# Patient Record
Sex: MALE | Race: WHITE | NOT HISPANIC OR LATINO | Employment: FULL TIME | URBAN - METROPOLITAN AREA
[De-identification: names, ages, dates, MRNs, and addresses within clinical notes are randomized per-mention and may not be internally consistent; named-entity substitution may affect disease eponyms.]

---

## 2017-09-11 ENCOUNTER — GENERIC CONVERSION - ENCOUNTER (OUTPATIENT)
Dept: OTHER | Facility: OTHER | Age: 27
End: 2017-09-11

## 2021-03-12 ENCOUNTER — HOSPITAL ENCOUNTER (EMERGENCY)
Facility: HOSPITAL | Age: 31
Discharge: HOME | End: 2021-03-13
Attending: EMERGENCY MEDICINE
Payer: COMMERCIAL

## 2021-03-12 VITALS
SYSTOLIC BLOOD PRESSURE: 157 MMHG | OXYGEN SATURATION: 99 % | TEMPERATURE: 98.2 F | HEART RATE: 83 BPM | RESPIRATION RATE: 18 BRPM | DIASTOLIC BLOOD PRESSURE: 92 MMHG

## 2021-03-12 DIAGNOSIS — K08.89 DENTALGIA: Primary | ICD-10-CM

## 2021-03-12 DIAGNOSIS — K02.9 DENTAL CARIES: ICD-10-CM

## 2021-03-12 PROCEDURE — 99282 EMERGENCY DEPT VISIT SF MDM: CPT

## 2021-03-12 RX ORDER — CHLORHEXIDINE GLUCONATE 0.12 MG/ML
15 RINSE ORAL ONCE
Status: COMPLETED | OUTPATIENT
Start: 2021-03-12 | End: 2021-03-13

## 2021-03-12 RX ORDER — ACETAMINOPHEN 325 MG/1
650 TABLET ORAL ONCE
Status: COMPLETED | OUTPATIENT
Start: 2021-03-12 | End: 2021-03-13

## 2021-03-12 RX ORDER — OXYCODONE HYDROCHLORIDE 5 MG/1
5 TABLET ORAL EVERY 6 HOURS PRN
Qty: 5 TABLET | Refills: 0 | Status: SHIPPED | OUTPATIENT
Start: 2021-03-12

## 2021-03-12 RX ORDER — BUPIVACAINE HYDROCHLORIDE 5 MG/ML
5 INJECTION, SOLUTION EPIDURAL; INTRACAUDAL ONCE
Status: COMPLETED | OUTPATIENT
Start: 2021-03-12 | End: 2021-03-13

## 2021-03-12 RX ORDER — LIDOCAINE HYDROCHLORIDE 10 MG/ML
6 INJECTION, SOLUTION EPIDURAL; INFILTRATION; INTRACAUDAL; PERINEURAL ONCE
Status: COMPLETED | OUTPATIENT
Start: 2021-03-12 | End: 2021-03-13

## 2021-03-13 PROCEDURE — 99284 EMERGENCY DEPT VISIT MOD MDM: CPT | Performed by: PHYSICIAN ASSISTANT

## 2021-03-13 RX ADMIN — CHLORHEXIDINE GLUCONATE 15 ML: 1.2 RINSE ORAL at 00:50

## 2021-03-13 RX ADMIN — BENZOCAINE 2 APPLICATION: 220 GEL, DENTIFRICE DENTAL at 00:50

## 2021-03-13 RX ADMIN — ACETAMINOPHEN 650 MG: 325 TABLET, FILM COATED ORAL at 00:06

## 2021-03-13 RX ADMIN — BUPIVACAINE HYDROCHLORIDE 5 ML: 5 INJECTION, SOLUTION EPIDURAL; INTRACAUDAL at 00:50

## 2021-03-13 RX ADMIN — LIDOCAINE HYDROCHLORIDE 6 ML: 10 INJECTION, SOLUTION EPIDURAL; INFILTRATION; INTRACAUDAL; PERINEURAL at 00:49

## 2021-03-13 NOTE — ED PROVIDER NOTES
EMERGENCY MEDICINE NOTE        PATIENT IDENTIFICATION PHYSICIAN/SERVICE INFORMATION   Name: Reuben Rosado  MRN: [id number]  YOB: 1990  Age/Sex: 27 y o  male  Preferred Language: English  Code Status: No Order  Encounter Date: 3/12/2021  Attending Physician: Lauren Hayes MD  Admitting Physician: No admitting provider for patient encounter  Primary Care Physician: No primary care provider on file  Primary Care Phone: None       CHIEF COMPLAINT     Chief Complaint   Patient presents with    Dental Pain     pt had root canal done, told he had abscess in there as well and given PO antibiotics  on abx since tuesday  pt states he feels like its getting better but his tooth is extremely painful  HISTORY OF PRESENT ILLNESS       History provided by:  Patient   used: No    Dental Pain  Location:  Upper  Upper teeth location:  2/RU 2nd molar  Quality:  Throbbing  Severity:  Moderate  Onset quality:  Gradual  Timing:  Constant  Progression:  Waxing and waning  Chronicity:  New  Context: dental caries, enamel fracture and poor dentition    Context: not abscess, cap still on, not crown fracture, filling intact, not intrusion, not malocclusion, not recent dental surgery and not trauma    Relieved by:  Nothing  Worsened by:  Touching  Ineffective treatments:  Acetaminophen and NSAIDs  Associated symptoms: facial pain    Associated symptoms: no congestion, no difficulty swallowing, no drooling, no facial swelling, no fever, no gum swelling, no headaches, no neck pain, no neck swelling, no oral bleeding, no oral lesions and no trismus    Risk factors: lack of dental care (just got insurance, saw dentist and was given amoxicillin which he is still taking) and smoking    Risk factors: no alcohol problem, no cancer, no chewing tobacco use, no diabetes, no immunosuppression and no periodontal disease          PAST MEDICAL AND SURGICAL HISTORY     History reviewed   No pertinent past medical history  Past Surgical History:   Procedure Laterality Date    WISDOM TOOTH EXTRACTION         History reviewed  No pertinent family history  E-Cigarette/Vaping     E-Cigarette/Vaping Substances     Social History     Tobacco Use    Smoking status: Current Some Day Smoker    Smokeless tobacco: Never Used   Substance Use Topics    Alcohol use: Yes     Comment: social    Drug use: Not Currently         ALLERGIES     No Known Allergies      HOME MEDICATIONS     None         REVIEW OF SYSTEMS     Review of Systems   Constitutional: Negative for activity change, appetite change, chills, diaphoresis, fatigue and fever  HENT: Positive for dental problem  Negative for congestion, drooling, facial swelling and mouth sores  Respiratory: Negative for cough and shortness of breath  Cardiovascular: Negative for chest pain  Gastrointestinal: Negative for abdominal pain  Musculoskeletal: Negative for neck pain  Skin: Negative for rash  Neurological: Negative for headaches  All other systems reviewed and are negative  PHYSICAL EXAMINATION     ED Triage Vitals   Temperature Pulse Respirations Blood Pressure SpO2   03/12/21 2230 03/12/21 2232 03/12/21 2232 03/12/21 2232 03/12/21 2232   98 2 °F (36 8 °C) 83 18 157/92 99 %      Temp Source Heart Rate Source Patient Position - Orthostatic VS BP Location FiO2 (%)   03/12/21 2230 03/12/21 2232 03/12/21 2232 03/12/21 2232 --   Oral Monitor Sitting Left arm       Pain Score       --                Wt Readings from Last 3 Encounters:   No data found for Wt         Physical Exam  Vitals signs and nursing note reviewed  Constitutional:       General: He is not in acute distress  Appearance: He is well-developed  He is not diaphoretic  HENT:      Head: Normocephalic and atraumatic  Mouth/Throat:      Mouth: Mucous membranes are moist       Dentition: Dental tenderness and dental caries present  Comments: No trismus   No facial asymmetry  Eyes:      Conjunctiva/sclera: Conjunctivae normal       Pupils: Pupils are equal, round, and reactive to light  Neck:      Musculoskeletal: Normal range of motion and neck supple  Cardiovascular:      Rate and Rhythm: Normal rate and regular rhythm  Pulmonary:      Effort: Pulmonary effort is normal  No respiratory distress  Musculoskeletal: Normal range of motion  Skin:     General: Skin is warm  Capillary Refill: Capillary refill takes less than 2 seconds  Findings: No rash  Neurological:      Mental Status: He is alert and oriented to person, place, and time  DIAGNOSTIC RESULTS     Laboratory results:    Labs Reviewed - No data to display    All labs reviewed and utilized in the medical decision making process    Radiology results:    No orders to display       All radiology studies independently viewed by me and interpreted by the radiologist       PROCEDURES     Procedures      Invasive Devices     None                 ASSESSMENT AND PLAN     MDM  Number of Diagnoses or Management Options  Dental caries: new, no workup  Dentalgia: new, no workup     Amount and/or Complexity of Data Reviewed  Review and summarize past medical records: yes    Risk of Complications, Morbidity, and/or Mortality  Presenting problems: moderate  Diagnostic procedures: low  Management options: moderate    Patient Progress  Patient progress: improved      Initial ED assessment:  Madelin De La Vega  is a 27 y o  male with no significant PMH who presents with Dental Pain  Vitals signs reviewed and WNL  Physical examination is remarkable for dental caries    Initial Ddx  includes but is not limited to:   dental javier, dental infection, dental abscess, dry socket, gingivitis; doubt govind's angina  Initial ED plan:   Plan will be to treat symptomatically   Final ED summary/disposition: Home care recommendations given with discharge paperwork   Return to ED instructions given if new/worsening sxs  Verbalized understanding  MDM  Reviewed: previous chart, nursing note and vitals          ED COURSE OF CARE AND REASSESSMENT     ED Course as of Mar 16 0357   Fri Mar 12, 2021   0827 Electronic controlled substance prescription not electronically prescribed due to being impractical for the patient to obtain the controlled substance by electronic prescription or would cause an untimely delay resulting in an adverse impact on the patient's medical condition  Medications   lidocaine (PF) (XYLOCAINE-MPF) 1 % injection 6 mL (6 mL Infiltration Given 3/13/21 0049)   bupivacaine (PF) (MARCAINE) 0 5 % injection 5 mL (5 mL Injection Given 3/13/21 0050)   BENZOCAINE (DENTAL) 20 % swab 2 application (2 application Oral Given 8/34/56 0050)   chlorhexidine (PERIDEX) 0 12 % oral rinse 15 mL (15 mL Swish & Spit Given 3/13/21 0050)   acetaminophen (TYLENOL) tablet 650 mg (650 mg Oral Given 3/13/21 0006)         FINAL IMPRESSION     Final diagnoses:   Dentalgia   Dental caries         DISPOSITION AND PLANNING     Time reflects when diagnosis was documented in both MDM as applicable and the Disposition within this note     Time User Action Codes Description Comment    3/12/2021 10:55 PM Fredda Share Add [K08 89] [address] [place] [address]     3/12/2021 10:55 PM Fredda Share Add [K02 9] Dental caries       ED Disposition     ED Disposition Condition Date/Time Comment    Discharge Stable Fri Mar 12, 2021 10:55 PM Nestora Lager  discharge to home/self care              Follow-up Information     Follow up With Specialties Details Why Contact Info Additional Information    [facility]  Call  For follow up [address]  [phone]     [employer]  Call  Call [employer] at  [phone] Gianni Aden ([phone]) to obtain a primary care physician for established care, reeval   They will be able to schedule you with a physician who sees patients with your insurance and physicians who see patients without insurance [id number] Internal Medicine [facility] [place] [facility] Call  For follow up [address] [address]  [address] [facility] Internal Medicine [facility] [place], [address] [place] [address] NE[facility] Emergency Department Emergency Medicine Go to  If symptoms worsen [address] [facility] [address] [facility] Emergency Department, [address], [facility] [place], [place], Amber Infantereina [facility] [address]    Nicolas Rajan [address]  [phone]  Call   For follow up    Eduardo Peterson  [phone]    Call   Call Cece Boo at  [phone] Angela Pitts ([phone]) to obtain a primary care physician for established care, reeval   They will be able to schedule you with a physician who sees patients with your insurance and physicians who see patients without insurance    [facility] Internal Medicine [facility] [place]  RACHAEL Madridandrei [address]  [phone]  Call   For follow up    [address] [facility] [address] [address]  [phone]  Go to   If symptoms worsen        DISCHARGE MEDICATIONS     Discharge Medication List as of 3/12/2021 10:56 PM      START taking these medications    Details   benzocaine (HURRICAINE) 20 % 1 application by Mucosal route 4 (four) times a day as needed for mucositis, Starting Fri 3/12/2021, Print             No discharge procedures on file      PDMP Review       Value Time User    PDMP Reviewed  Yes 3/12/2021 10:57 PM AMARI Reyes PA-C Shireen Nao, [place]  03/16/21 [id number]

## 2021-11-22 ENCOUNTER — HOSPITAL ENCOUNTER (EMERGENCY)
Facility: HOSPITAL | Age: 31
Discharge: HOME | End: 2021-11-22
Attending: EMERGENCY MEDICINE | Admitting: EMERGENCY MEDICINE
Payer: COMMERCIAL

## 2021-11-22 VITALS
HEART RATE: 96 BPM | WEIGHT: 315 LBS | DIASTOLIC BLOOD PRESSURE: 80 MMHG | OXYGEN SATURATION: 98 % | HEIGHT: 72 IN | TEMPERATURE: 98.7 F | RESPIRATION RATE: 20 BRPM | BODY MASS INDEX: 42.66 KG/M2 | SYSTOLIC BLOOD PRESSURE: 136 MMHG

## 2021-11-22 DIAGNOSIS — K02.9 PAIN DUE TO DENTAL CARIES: Primary | ICD-10-CM

## 2021-11-22 PROCEDURE — 96372 THER/PROPH/DIAG INJ SC/IM: CPT

## 2021-11-22 PROCEDURE — 99284 EMERGENCY DEPT VISIT MOD MDM: CPT

## 2021-11-22 PROCEDURE — 99282 EMERGENCY DEPT VISIT SF MDM: CPT

## 2021-11-22 RX ORDER — BUPIVACAINE HYDROCHLORIDE 5 MG/ML
5 INJECTION, SOLUTION EPIDURAL; INTRACAUDAL ONCE
Status: COMPLETED | OUTPATIENT
Start: 2021-11-22 | End: 2021-11-22

## 2021-11-22 RX ORDER — PENICILLIN V POTASSIUM 500 MG/1
500 TABLET ORAL 3 TIMES DAILY
Qty: 30 TABLET | Refills: 0 | Status: SHIPPED | OUTPATIENT
Start: 2021-11-22 | End: 2021-12-02

## 2021-11-22 RX ORDER — KETOROLAC TROMETHAMINE 30 MG/ML
15 INJECTION, SOLUTION INTRAMUSCULAR; INTRAVENOUS ONCE
Status: COMPLETED | OUTPATIENT
Start: 2021-11-22 | End: 2021-11-22

## 2021-11-22 RX ADMIN — BUPIVACAINE HYDROCHLORIDE 5 ML: 5 INJECTION, SOLUTION EPIDURAL; INTRACAUDAL at 05:32

## 2021-11-22 RX ADMIN — BENZOCAINE 1 APPLICATION: 220 GEL, DENTIFRICE DENTAL at 05:34

## 2021-11-22 RX ADMIN — KETOROLAC TROMETHAMINE 15 MG: 30 INJECTION, SOLUTION INTRAMUSCULAR at 05:31

## 2022-08-18 ENCOUNTER — HOSPITAL ENCOUNTER (EMERGENCY)
Facility: HOSPITAL | Age: 32
Discharge: HOME | End: 2022-08-18
Attending: EMERGENCY MEDICINE
Payer: COMMERCIAL

## 2022-08-18 ENCOUNTER — APPOINTMENT (EMERGENCY)
Dept: CT IMAGING | Facility: HOSPITAL | Age: 32
End: 2022-08-18
Payer: COMMERCIAL

## 2022-08-18 VITALS
SYSTOLIC BLOOD PRESSURE: 151 MMHG | RESPIRATION RATE: 18 BRPM | OXYGEN SATURATION: 98 % | WEIGHT: 315 LBS | HEART RATE: 117 BPM | TEMPERATURE: 97.7 F | DIASTOLIC BLOOD PRESSURE: 91 MMHG | BODY MASS INDEX: 41.75 KG/M2 | HEIGHT: 73 IN

## 2022-08-18 DIAGNOSIS — R51.9 HEAD PAIN: ICD-10-CM
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
## 2022-08-18 PROCEDURE — 70450 CT HEAD/BRAIN W/O DYE: CPT

## 2022-08-18 PROCEDURE — 99282 EMERGENCY DEPT VISIT SF MDM: CPT | Performed by: EMERGENCY MEDICINE

## 2022-08-18 PROCEDURE — 72125 CT NECK SPINE W/O DYE: CPT

## 2022-08-18 PROCEDURE — 99284 EMERGENCY DEPT VISIT MOD MDM: CPT
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
## 2022-08-18 NOTE — DISCHARGE INSTRUCTIONS
CT of your head and cervical spine was without significant findings  Follow-up the primary care provider if you develop concussion like symptoms  The symptoms include headache, problems with concentration  Come back to emergency department if you have neurological deficits numbness, tingling, weakness, problems walking

## 2023-07-26 ENCOUNTER — HOSPITAL ENCOUNTER (EMERGENCY)
Facility: HOSPITAL | Age: 33
Discharge: HOME | End: 2023-07-26
Attending: EMERGENCY MEDICINE

## 2023-07-26 ENCOUNTER — APPOINTMENT (EMERGENCY)
Dept: CT IMAGING | Facility: HOSPITAL | Age: 33
End: 2023-07-26

## 2023-07-26 VITALS
TEMPERATURE: 97.6 F | SYSTOLIC BLOOD PRESSURE: 128 MMHG | RESPIRATION RATE: 16 BRPM | OXYGEN SATURATION: 97 % | HEART RATE: 98 BPM | DIASTOLIC BLOOD PRESSURE: 89 MMHG
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
## 2023-07-26 DIAGNOSIS — S09.90XA CLOSED HEAD INJURY, INITIAL ENCOUNTER: ICD-10-CM

## 2023-07-26 DIAGNOSIS — S06.0XAA CONCUSSION WITH UNKNOWN LOSS OF CONSCIOUSNESS STATUS, INITIAL ENCOUNTER: ICD-10-CM

## 2023-07-26 DIAGNOSIS — R11.0 NAUSEA: ICD-10-CM

## 2023-07-26 PROCEDURE — G1004 CDSM NDSC: HCPCS

## 2023-07-26 PROCEDURE — 99284 EMERGENCY DEPT VISIT MOD MDM: CPT | Performed by: EMERGENCY MEDICINE

## 2023-07-26 PROCEDURE — 72125 CT NECK SPINE W/O DYE: CPT

## 2023-07-26 PROCEDURE — 70450 CT HEAD/BRAIN W/O DYE: CPT

## 2023-07-26 PROCEDURE — 99284 EMERGENCY DEPT VISIT MOD MDM: CPT

## 2023-07-26 RX ORDER — ONDANSETRON 4 MG/1
4 TABLET, FILM COATED ORAL EVERY 8 HOURS PRN
Qty: 15 TABLET | Refills: 0 | Status: SHIPPED | OUTPATIENT
Start: 2023-07-26 | End: 2023-07-31

## 2023-07-27 NOTE — DISCHARGE INSTRUCTIONS
Follow-up with your primary care physician. You should also follow-up with the comprehensive concussion clinic if your symptoms persist.  Take the prescribed nausea medication as directed. He can take Tylenol and Motrin every 6 hours as needed for headache. Please return to the emergency department if you develop worsening symptoms, severe pain, uncontrolled vomiting, focal weakness, or anything else concerning to you.

## 2023-07-27 NOTE — ED PROVIDER NOTES
History  Chief Complaint   Patient presents with   • Fall     Pt reports building a chicken coop earlier when he hit his head on a 2x4. Pt does not remember events between hitting his head and landing on ground, pt unsure if there was LOC. Pt denies thinners, c/o HA and nausea     27-year-old male presenting for evaluation after head injury. Patient reports that just prior to arrival, he was up on a ladder building a chicken coop when he accidentally fell. He reports falling approximately 4 to 5 feet. During the fall, he struck his head on a piece of wood. He is unsure if he lost consciousness after this head strike as he does not remember the rest of the fall to the ground. Approximately 10 minutes later, he developed dizziness, nausea, and headache. He denies any vision changes or focal weakness. He has been able to ambulate without difficulty. He denies any other injury. He has not had any anticoagulation. Prior to Admission Medications   Prescriptions Last Dose Informant Patient Reported? Taking?   benzocaine (HURRICAINE) 20 %   No No   Si application by Mucosal route 4 (four) times a day as needed for mucositis   Patient not taking: Reported on 2022   oxyCODONE (ROXICODONE) 5 mg immediate release tablet   No No   Sig: Take 1 tablet (5 mg total) by mouth every 6 (six) hours as needed for moderate pain for up to 5 dosesMax Daily Amount: 20 mg   Patient not taking: Reported on 2022      Facility-Administered Medications: None       History reviewed. No pertinent past medical history. Past Surgical History:   Procedure Laterality Date   • WISDOM TOOTH EXTRACTION         History reviewed. No pertinent family history. I have reviewed and agree with the history as documented.     E-Cigarette/Vaping     E-Cigarette/Vaping Substances     Social History     Tobacco Use   • Smoking status: Some Days   • Smokeless tobacco: Never   Substance Use Topics   • Alcohol use: Yes     Comment: social   • Drug use: Not Currently       Review of Systems   Constitutional: Negative for fever. Eyes: Negative for visual disturbance. Respiratory: Negative for shortness of breath. Cardiovascular: Negative for chest pain. Gastrointestinal: Positive for nausea. Negative for abdominal pain and vomiting. Genitourinary: Negative for flank pain. Musculoskeletal: Positive for neck pain. Negative for back pain and gait problem. Skin: Negative for wound. Neurological: Positive for headaches. Negative for weakness and numbness. All other systems reviewed and are negative. Physical Exam  Physical Exam  Vitals and nursing note reviewed. Constitutional:       General: He is not in acute distress. Appearance: He is not ill-appearing. HENT:      Head: Normocephalic and atraumatic. Nose: Nose normal.      Mouth/Throat:      Mouth: Mucous membranes are moist.      Comments: No intraoral injury. Eyes:      Extraocular Movements: Extraocular movements intact. Conjunctiva/sclera: Conjunctivae normal.      Pupils: Pupils are equal, round, and reactive to light. Neck:      Comments: Left-sided cervical tenderness. Minimal midline tenderness. No step-off or deformity. Cardiovascular:      Rate and Rhythm: Normal rate and regular rhythm. Heart sounds: No murmur heard. No friction rub. No gallop. Pulmonary:      Effort: Pulmonary effort is normal.      Breath sounds: Normal breath sounds. No wheezing or rales. Abdominal:      General: There is no distension. Palpations: Abdomen is soft. Tenderness: There is no abdominal tenderness. Musculoskeletal:         General: No swelling or tenderness. Normal range of motion. Cervical back: Normal range of motion and neck supple. Comments: No midline T or L-spine tenderness. Skin:     General: Skin is warm and dry. Findings: No rash. Neurological:      General: No focal deficit present.       Mental Status: He is alert and oriented to person, place, and time. Cranial Nerves: No cranial nerve deficit. Sensory: No sensory deficit. Motor: No weakness. Gait: Gait normal.   Psychiatric:         Behavior: Behavior normal.         Vital Signs  ED Triage Vitals   Temperature Pulse Respirations Blood Pressure SpO2   07/26/23 2102 07/26/23 2059 07/26/23 2059 07/26/23 2059 07/26/23 2059   97.6 °F (36.4 °C) 98 16 128/89 97 %      Temp Source Heart Rate Source Patient Position - Orthostatic VS BP Location FiO2 (%)   07/26/23 2102 -- -- -- --   Oral          Pain Score       07/26/23 2109       5           Vitals:    07/26/23 2059   BP: 128/89   Pulse: 98         Visual Acuity      ED Medications  Medications - No data to display    Diagnostic Studies  Results Reviewed     None                 CT cervical spine without contrast   Final Result by Anna Tate MD (07/26 2236)      No cervical spine fracture or traumatic malalignment. Workstation performed: [id number]         CT head without contrast   Final Result by Anna Tate MD (07/26 2230)      No acute intracranial abnormality. Workstation performed: [id number]                    Procedures  Procedures         ED Course                                             Medical Decision Making  77-year-old male presenting for evaluation after head injury. Patient with no evidence of trauma on exam.  Normal neurologic exam.  Given symptoms, differential diagnoses include but not limited to intracranial hemorrhage, concussion, skull fracture. CT without any acute traumatic injuries noted. Patient likely with concussion based on symptoms. CT neck without any acute traumatic injuries as well. Patient's symptoms are mild at this time and he does not wish to have any medication for them. Patient is otherwise stable for discharge. Referred to comprehensive concussion program for follow-up.   Return precautions discussed. Closed head injury, initial encounter: acute illness or injury  Concussion with unknown loss of consciousness status, initial encounter: acute illness or injury  Fall, initial encounter: acute illness or injury  Nausea: acute illness or injury  Amount and/or Complexity of Data Reviewed  Radiology: ordered. Risk  Prescription drug management. Disposition  Final diagnoses:   Fall, initial encounter   Closed head injury, initial encounter   Concussion with unknown loss of consciousness status, initial encounter   Nausea     Time reflects when diagnosis was documented in both MDM as applicable and the Disposition within this note     Time User Action Codes Description Comment    7/26/2023 10:38 PM Jusdomoniquejosy Taras [F54. UUSU] CLQL, initial encounter     7/26/2023 10:38 PM GitCafeon Gera Add [S09.90XA] Closed head injury, initial encounter     7/26/2023 10:38 PM GitCafeon Gera Add [S06. 0XAA] Concussion with unknown loss of consciousness status, initial encounter     7/26/2023 10:39 PM Meldon Gera Add [R11.0] Nausea       ED Disposition     ED Disposition   Discharge    Condition   Stable    Date/Time   Wed Jul 26, 2023 10:38 PM    Comment   Radha Gutierrez. discharge to home/self care.                Follow-up Information    None         Discharge Medication List as of 7/26/2023 10:40 PM      START taking these medications    Details   ondansetron (ZOFRAN) 4 mg tablet Take 1 tablet (4 mg total) by mouth every 8 (eight) hours as needed for nausea or vomiting for up to 5 days, Starting Wed 7/26/2023, Until Mon 7/31/2023 at 2359, Normal         CONTINUE these medications which have NOT CHANGED    Details   benzocaine (HURRICAINE) 20 % 1 application by Mucosal route 4 (four) times a day as needed for mucositis, Starting Fri 3/12/2021, Print      oxyCODONE (ROXICODONE) 5 mg immediate release tablet Take 1 tablet (5 mg total) by mouth every 6 (six) hours as needed for moderate pain for up to 5 dosesMax Daily Amount: 20 mg, Starting Fri 3/12/2021, Print                 PDMP Review       Value Time User    PDMP Reviewed  Yes 11/22/2021  3:38 AM Dorinda Rehman MD          ED Provider  Electronically Signed by           Amanda Parish MD  07/26/23 2630

## 2024-01-30 ENCOUNTER — APPOINTMENT (OUTPATIENT)
Dept: LAB | Facility: CLINIC | Age: 34
End: 2024-01-30
Payer: COMMERCIAL

## 2024-01-30 DIAGNOSIS — Z79.899 ENCOUNTER FOR LONG-TERM (CURRENT) USE OF OTHER MEDICATIONS: ICD-10-CM

## 2024-01-30 DIAGNOSIS — E78.5 HYPERLIPIDEMIA, UNSPECIFIED HYPERLIPIDEMIA TYPE: ICD-10-CM

## 2024-01-30 DIAGNOSIS — I45.81 LONG QT SYNDROME: ICD-10-CM

## 2024-01-30 LAB
ALBUMIN SERPL BCP-MCNC: 4.1 G/DL (ref 3.5–5)
ALP SERPL-CCNC: 67 U/L (ref 34–104)
ALT SERPL W P-5'-P-CCNC: 16 U/L (ref 7–52)
ANION GAP SERPL CALCULATED.3IONS-SCNC: 6 MMOL/L
AST SERPL W P-5'-P-CCNC: 13 U/L (ref 13–39)
ATRIAL RATE: 61 BPM
BASOPHILS # BLD AUTO: 0.05 THOUSANDS/ÂΜL (ref 0–0.1)
BASOPHILS NFR BLD AUTO: 1 % (ref 0–1)
BILIRUB SERPL-MCNC: 0.54 MG/DL (ref 0.2–1)
BUN SERPL-MCNC: 16 MG/DL (ref 5–25)
CALCIUM SERPL-MCNC: 8.9 MG/DL (ref 8.4–10.2)
CHLORIDE SERPL-SCNC: 104 MMOL/L (ref 96–108)
CHOLEST SERPL-MCNC: 172 MG/DL
CO2 SERPL-SCNC: 26 MMOL/L (ref 21–32)
CREAT SERPL-MCNC: 0.85 MG/DL (ref 0.6–1.3)
EOSINOPHIL # BLD AUTO: 0.26 THOUSAND/ÂΜL (ref 0–0.61)
EOSINOPHIL NFR BLD AUTO: 3 % (ref 0–6)
ERYTHROCYTE [DISTWIDTH] IN BLOOD BY AUTOMATED COUNT: 12.5 % (ref 11.6–15.1)
GFR SERPL CREATININE-BSD FRML MDRD: 114 ML/MIN/1.73SQ M
GLUCOSE P FAST SERPL-MCNC: 117 MG/DL (ref 65–99)
HCT VFR BLD AUTO: 44 % (ref 36.5–49.3)
HDLC SERPL-MCNC: 43 MG/DL
HGB BLD-MCNC: 14.9 G/DL (ref 12–17)
IMM GRANULOCYTES # BLD AUTO: 0.02 THOUSAND/UL (ref 0–0.2)
IMM GRANULOCYTES NFR BLD AUTO: 0 % (ref 0–2)
LDLC SERPL CALC-MCNC: 105 MG/DL (ref 0–100)
LYMPHOCYTES # BLD AUTO: 1.75 THOUSANDS/ÂΜL (ref 0.6–4.47)
LYMPHOCYTES NFR BLD AUTO: 21 % (ref 14–44)
MCH RBC QN AUTO: 27.5 PG (ref 26.8–34.3)
MCHC RBC AUTO-ENTMCNC: 33.9 G/DL (ref 31.4–37.4)
MCV RBC AUTO: 81 FL (ref 82–98)
MONOCYTES # BLD AUTO: 0.73 THOUSAND/ÂΜL (ref 0.17–1.22)
MONOCYTES NFR BLD AUTO: 9 % (ref 4–12)
NEUTROPHILS # BLD AUTO: 5.7 THOUSANDS/ÂΜL (ref 1.85–7.62)
NEUTS SEG NFR BLD AUTO: 66 % (ref 43–75)
NONHDLC SERPL-MCNC: 129 MG/DL
NRBC BLD AUTO-RTO: 0 /100 WBCS
P AXIS: 35 DEGREES
PLATELET # BLD AUTO: 326 THOUSANDS/UL (ref 149–390)
PMV BLD AUTO: 9.1 FL (ref 8.9–12.7)
POTASSIUM SERPL-SCNC: 4.1 MMOL/L (ref 3.5–5.3)
PR INTERVAL: 186 MS
PROT SERPL-MCNC: 7 G/DL (ref 6.4–8.4)
QRS AXIS: 50 DEGREES
QRSD INTERVAL: 98 MS
QT INTERVAL: 374 MS
QTC INTERVAL: 376 MS
RBC # BLD AUTO: 5.42 MILLION/UL (ref 3.88–5.62)
SODIUM SERPL-SCNC: 136 MMOL/L (ref 135–147)
T WAVE AXIS: 34 DEGREES
T4 SERPL-MCNC: 8.25 UG/DL (ref 6.09–12.23)
TRIGL SERPL-MCNC: 122 MG/DL
TSH SERPL DL<=0.05 MIU/L-ACNC: 2.37 UIU/ML (ref 0.45–4.5)
VENTRICULAR RATE: 61 BPM
WBC # BLD AUTO: 8.51 THOUSAND/UL (ref 4.31–10.16)

## 2024-01-30 PROCEDURE — G0480 DRUG TEST DEF 1-7 CLASSES: HCPCS

## 2024-01-30 PROCEDURE — 36415 COLL VENOUS BLD VENIPUNCTURE: CPT

## 2024-01-30 PROCEDURE — 80307 DRUG TEST PRSMV CHEM ANLYZR: CPT

## 2024-01-30 PROCEDURE — 80324 DRUG SCREEN AMPHETAMINES 1/2: CPT

## 2024-01-30 PROCEDURE — 80061 LIPID PANEL: CPT

## 2024-01-30 PROCEDURE — 84443 ASSAY THYROID STIM HORMONE: CPT

## 2024-01-30 PROCEDURE — 80053 COMPREHEN METABOLIC PANEL: CPT

## 2024-01-30 PROCEDURE — 85025 COMPLETE CBC W/AUTO DIFF WBC: CPT

## 2024-01-30 PROCEDURE — 80359 METHYLENEDIOXYAMPHETAMINES: CPT

## 2024-01-30 PROCEDURE — 84436 ASSAY OF TOTAL THYROXINE: CPT

## 2024-02-03 LAB
6MAM UR QL SCN: NEGATIVE NG/ML
ACCEPTABLE CREAT UR QL: 66 MG/DL
AMPHET UR QL CFM: 1606 NG/MG CREAT
AMPHET UR QL SCN: ABNORMAL NG/ML
BARBITURATES UR QL SCN: NEGATIVE NG/ML
BENZODIAZ UR QL SCN: NEGATIVE NG/ML
BUPRENORPHINE UR QL CFM: NEGATIVE NG/ML
CANNABINOIDS UR QL SCN: NEGATIVE NG/ML
CARISOPRODOL UR QL: NEGATIVE NG/ML
COCAINE+BZE UR QL SCN: NEGATIVE NG/ML
ETHYL GLUCURONIDE UR QL SCN: NEGATIVE NG/ML
FENTANYL UR QL SCN: NEGATIVE NG/ML
GABAPENTIN SERPLBLD QL SCN: NEGATIVE UG/ML
MDMA UR QL CFM: NOT DETECTED NG/MG CREAT
MDMA UR QL CFM: NOT DETECTED NG/MG CREAT
METHADONE UR QL SCN: NEGATIVE NG/ML
METHAMPHET UR QL CFM: NOT DETECTED NG/MG CREAT
NITRITE UR QL STRIP: NEGATIVE UG/ML
OPIATES UR QL SCN: NEGATIVE NG/ML
OXYCODONE+OXYMORPHONE UR QL SCN: NEGATIVE NG/ML
PCP UR QL SCN: NEGATIVE NG/ML
PROPOXYPH UR QL SCN: NEGATIVE NG/ML
SPECIMEN PH ACCEPTABLE UR: 6 (ref 4.5–8.9)
TAPENTADOL UR QL SCN: NEGATIVE NG/ML
TRAMADOL UR QL SCN: NEGATIVE NG/ML

## 2025-06-06 ENCOUNTER — APPOINTMENT (EMERGENCY)
Dept: RADIOLOGY | Facility: HOSPITAL | Age: 35
End: 2025-06-06
Payer: COMMERCIAL

## 2025-06-06 ENCOUNTER — HOSPITAL ENCOUNTER (EMERGENCY)
Facility: HOSPITAL | Age: 35
Discharge: HOME | End: 2025-06-06
Attending: EMERGENCY MEDICINE
Payer: COMMERCIAL

## 2025-06-06 VITALS
HEART RATE: 115 BPM | RESPIRATION RATE: 18 BRPM | TEMPERATURE: 98.1 F | SYSTOLIC BLOOD PRESSURE: 190 MMHG | OXYGEN SATURATION: 97 % | DIASTOLIC BLOOD PRESSURE: 94 MMHG

## 2025-06-06 DIAGNOSIS — S93.409A ANKLE SPRAIN: Primary | ICD-10-CM

## 2025-06-06 PROCEDURE — 73610 X-RAY EXAM OF ANKLE: CPT

## 2025-06-06 PROCEDURE — 99283 EMERGENCY DEPT VISIT LOW MDM: CPT

## 2025-06-06 PROCEDURE — 99284 EMERGENCY DEPT VISIT MOD MDM: CPT | Performed by: EMERGENCY MEDICINE

## 2025-06-13 NOTE — ED PROVIDER NOTES
Time reflects when diagnosis was documented in both MDM as applicable and the Disposition within this note       Time User Action Codes Description Comment    6/6/2025 10:34 PM Jen Rockwell Add [S93.409A] Ankle sprain           ED Disposition       ED Disposition   Discharge    Condition   Stable    Date/Time   Fri Jun 6, 2025 10:34 PM    Comment   Shekhar Green Jr. discharge to home/self care.                   Assessment & Plan       Medical Decision Making  34-year-old male with right ankle injury.  He slid down a hill and his right foot got trapped underneath him and his ankle twisted.  Since then he has pain with ambulation and some swelling.  He is well-appearing on arrival.  Mildly tachycardic after ambulating in but resolved at rest.  Some generalized right ankle tenderness.  No deformity.  Normal perfusion.  Ankle x-ray obtained shows no acute bony abnormality per my independent interpretation.  Patient advised on RICE.  Stable for discharge.    Amount and/or Complexity of Data Reviewed  Radiology: ordered.             Medications - No data to display    ED Risk Strat Scores                    No data recorded        SBIRT 20yo+      Flowsheet Row Most Recent Value   Initial Alcohol Screen: US AUDIT-C     1. How often do you have a drink containing alcohol? 0 Filed at: 06/06/2025 2154   2. How many drinks containing alcohol do you have on a typical day you are drinking?  0 Filed at: 06/06/2025 2154   3a. Male UNDER 65: How often do you have five or more drinks on one occasion? 0 Filed at: 06/06/2025 2154   Audit-C Score 0 Filed at: 06/06/2025 2154   SAMEER: How many times in the past year have you...    Used an illegal drug or used a prescription medication for non-medical reasons? Never Filed at: 06/06/2025 2154                            History of Present Illness       Chief Complaint   Patient presents with    Ankle Injury     Pt reports that he slid down a hill and right ankle got trapped underneath        Past Medical History[1]   Past Surgical History[2]   Family History[3]   Social History[4]   E-Cigarette/Vaping      E-Cigarette/Vaping Substances      I have reviewed and agree with the history as documented.     34-year-old male with right ankle injury.  He slid down a hill and his right foot got trapped underneath him and his ankle twisted.  Since then he has pain with ambulation and some swelling.        Review of Systems   All other systems reviewed and are negative.          Objective       ED Triage Vitals [06/06/25 2145]   Temperature Pulse Blood Pressure Respirations SpO2 Patient Position - Orthostatic VS   98.1 °F (36.7 °C) (!) 115 (!) 190/94 18 97 % Sitting      Temp Source Heart Rate Source BP Location FiO2 (%) Pain Score    Oral Monitor Left arm -- --      Vitals      Date and Time Temp Pulse SpO2 Resp BP Pain Score FACES Pain Rating User   06/06/25 2145 98.1 °F (36.7 °C) 115 97 % 18 190/94 -- -- AM            Physical Exam  Constitutional:       General: He is not in acute distress.  HENT:      Mouth/Throat:      Mouth: Mucous membranes are moist.     Cardiovascular:      Rate and Rhythm: Regular rhythm. Tachycardia present.   Pulmonary:      Effort: Pulmonary effort is normal.     Musculoskeletal:         General: Normal range of motion.      Comments: Mild swelling and generalized tenderness to right ankle, no deformity, normal perfusion     Neurological:      General: No focal deficit present.      Mental Status: He is alert and oriented to person, place, and time.     Psychiatric:         Mood and Affect: Mood normal.         Behavior: Behavior normal.         Results Reviewed       None            XR ankle 3+ views RIGHT   Final Interpretation by Alonzo Salcedo MD (06/07 5032)      No acute osseous abnormality.         Computerized Assisted Algorithm (CAA) may have been used to analyze all applicable images.               Workstation performed: [id number]              Procedures    ED Medication and Procedure Management   Prior to Admission Medications   Prescriptions Last Dose Informant Patient Reported? Taking?   benzocaine (HURRICAINE) 20 %   No No   Si application by Mucosal route 4 (four) times a day as needed for mucositis   Patient not taking: Reported on 2022   ondansetron (ZOFRAN) 4 mg tablet   No No   Sig: Take 1 tablet (4 mg total) by mouth every 8 (eight) hours as needed for nausea or vomiting for up to 5 days   oxyCODONE (ROXICODONE) 5 mg immediate release tablet   No No   Sig: Take 1 tablet (5 mg total) by mouth every 6 (six) hours as needed for moderate pain for up to 5 dosesMax Daily Amount: 20 mg   Patient not taking: Reported on 2022      Facility-Administered Medications: None     Discharge Medication List as of 2025 10:34 PM        CONTINUE these medications which have NOT CHANGED    Details   benzocaine (HURRICAINE) 20 % 1 application by Mucosal route 4 (four) times a day as needed for mucositis, Starting Fri 3/12/2021, Print      ondansetron (ZOFRAN) 4 mg tablet Take 1 tablet (4 mg total) by mouth every 8 (eight) hours as needed for nausea or vomiting for up to 5 days, Starting Wed 2023, Until 2023 at 2359, Normal      oxyCODONE (ROXICODONE) 5 mg immediate release tablet Take 1 tablet (5 mg total) by mouth every 6 (six) hours as needed for moderate pain for up to 5 dosesMax Daily Amount: 20 mg, Starting Fri 3/12/2021, Print           No discharge procedures on file.  ED SEPSIS DOCUMENTATION   Time reflects when diagnosis was documented in both MDM as applicable and the Disposition within this note       Time User Action Codes Description Comment    2025 10:34 PM Jen Rockwell Add [S93.409A] Ankle sprain                    [1] No past medical history on file.  [2]   Past Surgical History:  Procedure Laterality Date    WISDOM TOOTH EXTRACTION     [3] No family history on file.  [4]   Social History  Tobacco Use     Smoking status: Some Days    Smokeless tobacco: Never   Substance Use Topics    Alcohol use: Yes     Comment: social    Drug use: Not Currently        Jen Rockwell MD  06/13/25 5519